# Patient Record
Sex: FEMALE | Race: WHITE | NOT HISPANIC OR LATINO | Employment: FULL TIME | URBAN - METROPOLITAN AREA
[De-identification: names, ages, dates, MRNs, and addresses within clinical notes are randomized per-mention and may not be internally consistent; named-entity substitution may affect disease eponyms.]

---

## 2022-07-14 ENCOUNTER — OFFICE VISIT (OUTPATIENT)
Dept: URGENT CARE | Age: 37
End: 2022-07-14

## 2022-07-14 VITALS
DIASTOLIC BLOOD PRESSURE: 74 MMHG | TEMPERATURE: 97.8 F | HEART RATE: 75 BPM | OXYGEN SATURATION: 100 % | RESPIRATION RATE: 16 BRPM | SYSTOLIC BLOOD PRESSURE: 140 MMHG

## 2022-07-14 DIAGNOSIS — J40 BRONCHITIS: Primary | ICD-10-CM

## 2022-07-14 PROCEDURE — 99214 OFFICE O/P EST MOD 30 MIN: CPT | Performed by: INTERNAL MEDICINE

## 2022-07-14 RX ORDER — METHYLPREDNISOLONE 4 MG
TABLET, DOSE PACK ORAL
Qty: 21 TABLET | Refills: 0 | Status: SHIPPED | OUTPATIENT
Start: 2022-07-14

## 2022-07-14 RX ORDER — AZITHROMYCIN 250 MG/1
TABLET, FILM COATED ORAL
Qty: 6 TABLET | Refills: 0 | Status: SHIPPED | OUTPATIENT
Start: 2022-07-14 | End: 2022-07-19

## 2022-07-14 RX ORDER — ATORVASTATIN CALCIUM 10 MG/1
TABLET, FILM COATED ORAL
COMMUNITY
Start: 2022-04-11

## 2022-07-14 RX ORDER — FLUOROMETHOLONE 0.1 %
SUSPENSION, DROPS(FINAL DOSAGE FORM)(ML) OPHTHALMIC (EYE)
COMMUNITY
Start: 2022-06-20

## 2022-07-14 RX ORDER — NORETHINDRONE ACETATE AND ETHINYL ESTRADIOL, ETHINYL ESTRADIOL AND FERROUS FUMARATE 1MG-10(24)
1 KIT ORAL DAILY
COMMUNITY
Start: 2022-06-02

## 2022-07-14 RX ORDER — DAPSONE 50 MG/G
GEL TOPICAL
COMMUNITY

## 2022-07-14 ASSESSMENT — PAIN SCALES - GENERAL: PAINLEVEL: 0

## 2024-12-09 ENCOUNTER — WALK IN (OUTPATIENT)
Dept: URGENT CARE | Age: 39
End: 2024-12-09

## 2024-12-09 VITALS
OXYGEN SATURATION: 100 % | HEART RATE: 79 BPM | DIASTOLIC BLOOD PRESSURE: 78 MMHG | RESPIRATION RATE: 18 BRPM | SYSTOLIC BLOOD PRESSURE: 118 MMHG | TEMPERATURE: 97.8 F

## 2024-12-09 DIAGNOSIS — J01.00 ACUTE MAXILLARY SINUSITIS, RECURRENCE NOT SPECIFIED: Primary | ICD-10-CM

## 2024-12-09 PROCEDURE — 99203 OFFICE O/P NEW LOW 30 MIN: CPT | Performed by: FAMILY MEDICINE

## 2024-12-09 RX ORDER — LORATADINE 10 MG/1
10 TABLET ORAL DAILY
COMMUNITY

## 2024-12-09 RX ORDER — DOXYCYCLINE HYCLATE 100 MG
100 TABLET ORAL 2 TIMES DAILY
Qty: 20 TABLET | Refills: 0 | Status: SHIPPED | OUTPATIENT
Start: 2024-12-09 | End: 2024-12-19

## 2024-12-09 ASSESSMENT — ENCOUNTER SYMPTOMS
ANOREXIA: 0
SINUS PRESSURE: 1
FEVER: 0
SINUS PAIN: 1
RHINORRHEA: 1
HEADACHES: 0

## 2024-12-09 ASSESSMENT — PAIN SCALES - GENERAL: PAINLEVEL: 0

## 2025-05-30 ENCOUNTER — OFFICE VISIT (OUTPATIENT)
Dept: FAMILY MEDICINE CLINIC | Facility: CLINIC | Age: 40
End: 2025-05-30
Payer: COMMERCIAL

## 2025-05-30 VITALS
HEIGHT: 61.75 IN | RESPIRATION RATE: 16 BRPM | TEMPERATURE: 97 F | BODY MASS INDEX: 22.3 KG/M2 | HEART RATE: 60 BPM | SYSTOLIC BLOOD PRESSURE: 98 MMHG | WEIGHT: 121.19 LBS | DIASTOLIC BLOOD PRESSURE: 58 MMHG

## 2025-05-30 DIAGNOSIS — Z00.00 ENCOUNTER FOR ROUTINE HISTORY AND PHYSICAL EXAMINATION: Primary | ICD-10-CM

## 2025-05-30 RX ORDER — ATORVASTATIN CALCIUM 10 MG/1
10 TABLET, FILM COATED ORAL DAILY
COMMUNITY
Start: 2024-08-05

## 2025-05-30 RX ORDER — SPIRONOLACTONE 50 MG/1
50 TABLET, FILM COATED ORAL EVERY OTHER DAY
COMMUNITY

## 2025-05-30 NOTE — PROGRESS NOTES
[facility] - [facility]    CC: yearly exam     HPI: Michaela Harvey is 39 year old female here for a yearly physical.    History of Present Illness  Michaela Harvey is a 39 year old female who presents for an annual physical exam.    She is considering transferring her gynecological care to this practice due to her previous provider's [facility]. Her Pap smear is current as of September 2024, and she had a mammogram earlier this year, with additional imaging required for her initial screening at age 39.    1. HLD. She has high cholesterol and has been on Lipitor since her early twenties. Her cholesterol levels were elevated in her last blood work in November. There is a significant family history of heart disease on her father's side, with many relatives also on Lipitor.    2. AcneShe takes spironolactone for acne and is on birth control, prescribed by her gynecologist. She is considering alternative contraceptive options as she does not plan to have children.    She engages in regular physical activity, attending [employer] classes three to four times a week and using home equipment when necessary. She consumes minimal alcohol and does not smoke.    She recently had an eye exam with a prescription change and is up to date with dental check-ups, last visiting the dentist in March.     Following with a functional med doc        Health Habits:  Tobacco use:  reports that she has never smoked. She does not have any smokeless tobacco history on file.  Alcohol Use. Rarely, occasional  Dental Exam. UTD  Eye Exam. UTD       GYN:  Birth Control. OCP      PMH:  Problem List[1]  Past Medical History[2]      Healthcare Maintenance:  Depression screen: negative  Osteoporosis screen: No history of pathologic fractures. No steroid use, smoking, risk of falls, excessive alcohol use  Health Maintenance   Topic Date Due    Annual Physical  Never done    COVID-19 Vaccine (1 - 2024-25 season) Never done    Influenza Vaccine (Season  Ended) 10/01/2025    Mammogram  12/06/2025    DTaP,Tdap,and Td Vaccines (2 - Td or Tdap) 06/28/2026    Pap Smear  09/04/2027    Annual Depression Screening  Completed    Pneumococcal Vaccine: Birth to 50yrs  Aged Out    Meningococcal B Vaccine  Aged Out            Last Physical 05/30/25   Problem List[3]  Past Medical History[4]      SH: reviewed     FH: reviewed     Social History     Social History Narrative    Not on file        ROS: full 10 point review of systems done and otherwise negative.         PE:  Vital Signs    05/30/25 1035   BP: 98/58   Pulse: 60   Resp: 16   Temp: 97.2 °F (36.2 °C)     Wt Readings from Last 3 Encounters:   05/30/25 121 lb 3.2 oz (55 kg)     Body mass index is 22.35 kg/m².     General: Comfortable, pleasant, NAD, appears stated age  HEENT.  NC/AT, no conjunctival injection, TMs clear, oropharynx without erythema or exudate, neck supple, no LAD or thyromegaly  CV.  RRR, no m/r/g  Pulm. CTAB, no W/R/R, comfortable work of breathing, speaks in full sentences  Abdomen. Soft, nt, nd  .  defer  Extremities.  2+ DP pulses, no edema  Skin.  No concerning moles       No results found for any previous visit.        A/P: Michaela Harvey is 39 year old yo female here for complete physical.  1. Healthcare Maintenance. Discussed eye exam, dentist visit q6 months, sunscreen, exercise at least 5x/week, 30 minutes daily, and limiting caffeine intake.     Assessment & Plan  Hyperlipidemia  Long-standing hyperlipidemia managed with atorvastatin. Family history of heart disease.  - Continue atorvastatin as prescribed.    General Health Maintenance  Up to date with mammogram and Pap smear. Considering switching care to this practice and exploring alternative birth control options.  - Ensure mammogram and Pap smear remain up to date.  - Discuss alternative birth control options if she switches prescriptions.  - Coordinate care transition if she switches to this practice for gynecological care.        Encounter Medications[5]        Side effects, risks and benefits of medications were explained.  The patient or responsible adult showed the ability to learn, asked appropriate questions.  There were no barriers to learning and they verbalized understanding of the treatment plan.     Medication list provided to patient and /or family member.     This note was created in part by Dragon recognition software.  Please excuse errors.                 [1] There is no problem list on file for this patient.   [2] History reviewed. No pertinent past medical history.  [3] There is no problem list on file for this patient.  [4] No past medical history on file.  [5]   Outpatient Encounter Medications as of 5/30/2025   Medication Sig Dispense Refill    spironolactone 50 MG Oral Tab Take 1 tablet (50 mg total) by mouth every other day.      atorvastatin 10 MG Oral Tab Take 1 tablet (10 mg total) by mouth daily.       No facility-administered encounter medications on file as of 5/30/2025.

## 2025-05-30 NOTE — PROGRESS NOTES
The following individual(s) verbally consented to be recorded using ambient AI listening technology and understand that they can each withdraw their consent to this listening technology at any point by asking the clinician to turn off or pause the recording:    Patient name: Michaela Harvey  Additional names:

## 2025-07-31 RX ORDER — ATORVASTATIN CALCIUM 10 MG/1
10 TABLET, FILM COATED ORAL DAILY
Qty: 90 TABLET | Refills: 3 | Status: SHIPPED | OUTPATIENT
Start: 2025-07-31